# Patient Record
Sex: FEMALE | Race: WHITE
[De-identification: names, ages, dates, MRNs, and addresses within clinical notes are randomized per-mention and may not be internally consistent; named-entity substitution may affect disease eponyms.]

---

## 2019-09-27 ENCOUNTER — HOSPITAL ENCOUNTER (INPATIENT)
Dept: HOSPITAL 56 - MW.OB | Age: 24
LOS: 2 days | Discharge: HOME | DRG: 560 | End: 2019-09-29
Attending: OBSTETRICS & GYNECOLOGY | Admitting: OBSTETRICS & GYNECOLOGY
Payer: COMMERCIAL

## 2019-09-27 DIAGNOSIS — O48.0: Primary | ICD-10-CM

## 2019-09-27 DIAGNOSIS — Z3A.41: ICD-10-CM

## 2019-09-27 PROCEDURE — 3E0P7VZ INTRODUCTION OF HORMONE INTO FEMALE REPRODUCTIVE, VIA NATURAL OR ARTIFICIAL OPENING: ICD-10-PCS | Performed by: OBSTETRICS & GYNECOLOGY

## 2019-09-27 NOTE — PCM.OPNOTE
- General Post-Op/Procedure Note


Date of Surgery/Procedure: 19


Operative Procedure(s): Spontaneous vaginal delivery


Findings: 





Live female infant, Apgars 8/9, weight pending, cord gases pending


Pre Op Diagnosis: 23yo  at 41w0d.  Induction of labor for post-dates


Post-Op Diagnosis: 23yo  s/p spontaneous vaginal delivery at 41w0d


Anesthesia Technique: Epidural


Primary Surgeon: Ivon Ruiz


Pathology: 





Placenta, cord gases, cord blood


EBL in mLs: 300


Complications: none


Condition: Good

## 2019-09-27 NOTE — PCM.PREANE
Preanesthetic Assessment





- Anesthesia/Transfusion/Family Hx


Anesthesia History: Prior Anesthesia Without Reaction


Transfusion History: No Prior Transfusion(s)





- Review of Systems


General: No Symptoms


Pulmonary: No Symptoms


Cardiovascular: No Symptoms


Gastrointestinal: No Symptoms


Neurological: No Symptoms


Other: Reports: None





- Physical Assessment


NPO Status Date: 09/27/19


NPO Status Time: 19:40


Height: 1.65 m


Weight: 77.111 kg


ASA Class: 2E


Mental Status: Alert & Oriented x3


Airway Class: Mallampati = 2


Dentition: Reports: Normal Dentition


Thyro-Mental Finger Breadths: 3


Mouth Opening Finger Breadths: 3


ROM/Head Extension: Full


Lungs: Clear to Auscultation, Normal Respiratory Effort


Cardiovascular: Regular Rate, Regular Rhythm





- Lab


Values: 





 Laboratory Last Values











WBC  9.00 K/uL (4.0-11.0)   09/27/19  11:26    


 


RBC  3.92 M/uL (4.30-5.90)  L  09/27/19  11:26    


 


Hgb  12.9 g/dL (12.0-16.0)   09/27/19  11:26    


 


Hct  36.8 % (36.0-46.0)   09/27/19  11:26    


 


MCV  93.9 fL (80.0-98.0)   09/27/19  11:26    


 


MCH  32.9 pg (27.0-32.0)  H  09/27/19  11:26    


 


MCHC  35.1 g/dL (31.0-37.0)   09/27/19  11:26    


 


RDW Std Deviation  45.7 fl (28.0-62.0)   09/27/19  11:26    


 


RDW Coeff of Pennie  13 % (11.0-15.0)   09/27/19  11:26    


 


Plt Count  243 K/uL (150-400)   09/27/19  11:26    


 


MPV  10.60 fL (7.40-12.00)   09/27/19  11:26    


 


Nucleated RBC %  0.0 /100WBC  09/27/19  11:26    


 


Nucleated RBCs #  0 K/uL  09/27/19  11:26    


 


Blood Type  A POSITIVE   09/27/19  11:26    


 


Antibody Screen  NEGATIVE   09/27/19  11:26    














- Allergies


Allergies/Adverse Reactions: 


 Allergies











Allergy/AdvReac Type Severity Reaction Status Date / Time


 


No Known Allergies Allergy   Verified 11/01/16 20:23














- Acknowledgements


Anesthesia Type Planned: Epidural


Pt an Appropriate Candidate for the Planned Anesthesia: Yes


Alternatives and Risks of Anesthesia Discussed w Pt/Guardian: Yes


Pt/Guardian Understands and Agrees with Anesthesia Plan: Yes





PreAnesthesia Questionnaire


HEENT History: Reports: None


Cardiovascular History: Reports: None


Respiratory History: Reports: None


Gastrointestinal History: Reports: None


Genitourinary History: Reports: None


OB/GYN History: Reports: Pregnancy


Musculoskeletal History: Reports: None


Neurological History: Reports: None


Psychiatric History: Reports: None


Endocrine/Metabolic History: Reports: None


Hematologic History: Reports: None


Immunologic History: Reports: None


Oncologic (Cancer) History: Reports: None


Dermatologic History: Reports: None





- Infectious Disease History


Infectious Disease History: Reports: None





- Past Surgical History


HEENT Surgical History: Reports: Oral Surgery


Other HEENT Surgeries/Procedures: wisdome teeth extraction-with sedation


Cardiovascular Surgical History: Reports: None


Respiratory Surgical History: Reports: None


GI Surgical History: Reports: None


Female  Surgical History: Reports: None


Musculoskeletal Surgical History: Reports: None





- SUBSTANCE USE


Smoking Status *Q: Never Smoker


Second Hand Smoke Exposure: No


Recreational Drug Use History: No





- HOME MEDS


Home Medications: 


 Home Meds





. [No Known Home Meds]  11/01/16 [History]











- CURRENT (IN HOUSE) MEDS


Current Meds: 





 Current Medications





Butorphanol Tartrate (Stadol)  1 mg IVPUSH Q1H PRN


   PRN Reason: Pain


Carboprost Tromethamine (Hemabate Ds)  250 mcg IM ASDIRECTED PRN


   PRN Reason: Post Partum Hemorrhage


Lactated Ringer's (Ringers, Lactated)  1,000 mls @ 150 mls/hr IV ASDIRECTED BERTRAM


   Last Admin: 09/27/19 17:10 Dose:  150 mls/hr


Oxytocin/Sodium Chloride (Oxytocin 30 Unit/500 Ml-Ns)  30 unit in 500 mls @ 500 

mls/hr IV TITRATE BERTRAM


Tranexamic Acid 1,000 mg/ (Sodium Chloride)  110 mls @ 660 mls/hr IV ONETIME PRN


   PRN Reason: Bleeding


Oxytocin/Sodium Chloride (Oxytocin 30 Unit/500 Ml-Ns)  30 unit in 500 mls @ 2 

mls/hr IV TITRATE BERTRAM; Protocol


   Last Titration: 09/27/19 18:40 Dose:  6 munits/min, 6 mls/hr


Lidocaine HCl (Xylocaine 1%)  50 ml INJECT ONETIME PRN


   PRN Reason: Laceration repair


Methylergonovine Maleate (Methergine)  0.2 mg IM ASDIRECTED PRN


   PRN Reason: Post Partum Hemorrhage


Misoprostol (Cytotec)  200 mcg PO ONETIME PRN


   PRN Reason: Post Partum Hemorrhage


Misoprostol (Cytotec)  25 mcg VAG ONETIME PRN


   PRN Reason: Cervical Ripening


   Last Admin: 09/27/19 12:44 Dose:  25 mcg


Misoprostol (Cytotec)  25 mcg VAG Q4H PRN


   PRN Reason: Cervical Ripening


Nalbuphine HCl (Nubain)  10 mg IVPUSH Q1H PRN


   PRN Reason: Pain (severe 7-10)


Sodium Chloride (Saline Flush)  10 ml FLUSH ASDIRECTED PRN


   PRN Reason: Keep Vein Open


Sodium Chloride (Saline Flush)  2.5 ml FLUSH ASDIRECTED PRN


   PRN Reason: Keep Vein Open


Sodium Chloride (Normal Saline)  10 ml IV ASDIRECTED PRN


   PRN Reason: IV Use


Sterile Water (Sterile Water For Irrigation)  1,000 ml IRR ASDIRECTED PRN


   PRN Reason: delivery


Terbutaline Sulfate (Brethine)  0.25 mg SUBCUT ASDIRECTED PRN


   PRN Reason: Tacysystole

## 2019-09-28 NOTE — OR
SURGEON:

Ivon Ruiz MD

 

DATE OF PROCEDURE:  2019

 

PREOPERATIVE DIAGNOSIS:

A 24-year-old, G2,  at 41 weeks and 0 days gestation being induced for

post dates.

 

POSTOPERATIVE DIAGNOSIS:

A 24-year-old, G2, P1-0-1-1 status post spontaneous vaginal delivery at 41 weeks

and 0 days gestation.

 

PROCEDURE:

Spontaneous vaginal delivery.

 

PRIMARY SURGEON:

Ivon Ruiz MD.

 

ANESTHESIA:

Epidural.

 

ESTIMATED BLOOD LOSS:

300 mL.

 

FINDINGS:

Live female infant in cephalic presentation.  Apgar score of 8 and 9 at 1 and 5

minutes respectively.  Umbilical artery pH 7.17, umbilical vein pH 7.25.  
Weight 3630g.

 

DESCRIPTION OF PROCEDURE:

The patient presented from clinic for induction of labor due to postdates.

Induction of labor began with Cytotec.  After 1 dose of Cytotec, she had

spontaneous rupture of membranes.  At that time, Pitocin was begun.  She

received an epidural.  She progressed to complete cervical dilation.  She pushed

and delivered a live female infant, Apgar score of 8 and 9 at 1 and 5 minutes

respectively.  The head delivered followed by the shoulders and remainder of

body.  The infant was placed on the maternal abdomen.  After 60 seconds of

delayed cord clamping, the cord was clamped and cut.  The placenta then

delivered spontaneously intact and with 3-vessel cord.  Cord gases and cord

blood were obtained.  The perineum was inspected.  Two perineal abrasions were

noted and were hemostatic with pressure.  Estimated blood loss was 300 mL.  The

patient and baby tolerated the delivery well.

 

 

[id number] / MODL

DD:  2019 23:15:02

DT:  2019 01:30:57

Job #:  [id number]

MTDD

## 2019-09-28 NOTE — [id number]
Post Anesthesia Note





- EVALUATION WITHIN 48HRS OF ANESTHETIC


Vital Signs in Normal Range: Yes


Patient Participated in Evaluation: Yes


Respiratory Function Stable: Yes


Airway Patent: Yes


Cardiovascular Function Stable: Yes


Hydration Status Stable: Yes


Pain Control Satisfactory: Yes


Nausea and Vomiting Control Satisfactory: Yes


Mental Status Recovered: Yes


Vital Signs: 


 Last Vital Signs











Temp  36.3 C   09/28/19 03:17


 


Pulse  79   09/28/19 03:17


 


Resp  16   09/28/19 03:17


 


BP  128/71   09/28/19 03:17


 


Pulse Ox  98   09/28/19 03:17














- COMMENTS/OBSERVATIONS


Free Text/Narrative:: 


The patient tolerated the procedure well.  There were no apparent anesthetic 

complications at this time.

## 2019-09-28 NOTE — PCM.PNPP
- General Info


Date of Service: 19


Admission Dx/Problem (Free Text): 





Patient doing well. Moderate lochia. Denies pain. Breastfeeding going well.


Functional Status: Reports: Pain Controlled, Tolerating Diet, Ambulating, 

Urinating





- Review of Systems


General: Denies: Fever


HEENT: Reports: No Symptoms


Pulmonary: Denies: Shortness of Breath


Cardiovascular: Denies: Chest Pain


Gastrointestinal: Denies: Abdominal Pain


Genitourinary: Denies: Dysuria


Musculoskeletal: Reports: No Symptoms


Skin: Reports: No Symptoms


Neurological: Reports: No Symptoms


Psychiatric: Reports: No Symptoms





- Patient Data


Vital Signs - Most Recent: 


 Last Vital Signs











Temp  36.6 C   19 08:40


 


Pulse  64   19 08:40


 


Resp  17   19 08:40


 


BP  126/69   19 08:40


 


Pulse Ox  98   19 08:40











Weight - Most Recent: 77.111 kg


Lab Results - Last 24 Hours: 


 Laboratory Results - last 24 hr











  19 Range/Units





  11:26 11:26 22:47 


 


WBC  9.00    (4.0-11.0)  K/uL


 


RBC  3.92 L    (4.30-5.90)  M/uL


 


Hgb  12.9    (12.0-16.0)  g/dL


 


Hct  36.8    (36.0-46.0)  %


 


MCV  93.9    (80.0-98.0)  fL


 


MCH  32.9 H    (27.0-32.0)  pg


 


MCHC  35.1    (31.0-37.0)  g/dL


 


RDW Std Deviation  45.7    (28.0-62.0)  fl


 


RDW Coeff of Pennie  13    (11.0-15.0)  %


 


Plt Count  243    (150-400)  K/uL


 


MPV  10.60    (7.40-12.00)  fL


 


Nucleated RBC %  0.0    /100WBC


 


Nucleated RBCs #  0    K/uL


 


Cord ABG pH    7.171 L  (7.18-7.38)  


 


Cord ABG Base Excess    -11 L  (-10--2)  


 


Cord VBG pH    7.251  (7.25-7.45)  


 


Cord VBG Base Excess    -11 L  (-10--2)  


 


Blood Type   A POSITIVE   


 


Antibody Screen   NEGATIVE   














  19 Range/Units





  05:55 


 


WBC   (4.0-11.0)  K/uL


 


RBC   (4.30-5.90)  M/uL


 


Hgb  12.1  (12.0-16.0)  g/dL


 


Hct  35.1 L  (36.0-46.0)  %


 


MCV   (80.0-98.0)  fL


 


MCH   (27.0-32.0)  pg


 


MCHC   (31.0-37.0)  g/dL


 


RDW Std Deviation   (28.0-62.0)  fl


 


RDW Coeff of Pennie   (11.0-15.0)  %


 


Plt Count   (150-400)  K/uL


 


MPV   (7.40-12.00)  fL


 


Nucleated RBC %   /100WBC


 


Nucleated RBCs #   K/uL


 


Cord ABG pH   (7.18-7.38)  


 


Cord ABG Base Excess   (-10--2)  


 


Cord VBG pH   (7.25-7.45)  


 


Cord VBG Base Excess   (-10--2)  


 


Blood Type   


 


Antibody Screen   











Med Orders - Current: 


 Current Medications





Acetaminophen (Tylenol Extra Strength)  500 mg PO Q4H PRN


   PRN Reason: Pain


Acetaminophen (Tylenol Extra Strength)  1,000 mg PO Q4H PRN


   PRN Reason: Pain


Benzocaine/Menthol (Dermoplast Pain Relief 20%-0.5% Spray)  78 gm TOP 

ASDIRECTED PRN


   PRN Reason: Perineal Comfort Measure


   Last Admin: 19 03:19 Dose:  1 can


Bisacodyl (Dulcolax)  10 mg RECTAL ONETIME PRN


   PRN Reason: Constipation


Butorphanol Tartrate (Stadol)  1 mg IVPUSH Q1H PRN


   PRN Reason: Pain


Carboprost Tromethamine (Hemabate Ds)  250 mcg IM ASDIRECTED PRN


   PRN Reason: Post Partum Hemorrhage


Docusate Sodium (Colace)  100 mg PO BID PRN


   PRN Reason: Constipation


Emollient Ointment (Lansinoh Hpa)  0 gm TOP ASDIRECTED PRN


   PRN Reason: Sore Nipples


Famotidine (Pepcid)  20 mg PO BID PRN


   PRN Reason: Heartburn


Lactated Ringer's (Ringers, Lactated)  1,000 mls @ 150 mls/hr IV ASDIRECTED BERTRAM


   Last Admin: 19 21:25 Dose:  150 mls/hr


Oxytocin/Sodium Chloride (Oxytocin 30 Unit/500 Ml-Ns)  30 unit in 500 mls @ 500 

mls/hr IV TITRATE BERTRAM


Tranexamic Acid 1,000 mg/ (Sodium Chloride)  110 mls @ 660 mls/hr IV ONETIME PRN


   PRN Reason: Bleeding


Oxytocin/Sodium Chloride (Oxytocin 30 Unit/500 Ml-Ns)  30 unit in 500 mls @ 2 

mls/hr IV TITRATE BERTRAM; Protocol


   Last Titration: 19 22:07 Dose:  6 munits/min, 6 mls/hr


Ibuprofen (Motrin)  800 mg PO Q8H PRN


   PRN Reason: Pain


   Last Admin: 19 03:19 Dose:  800 mg


Lidocaine HCl (Xylocaine 1%)  50 ml INJECT ONETIME PRN


   PRN Reason: Laceration repair


Methylergonovine Maleate (Methergine)  0.2 mg IM ASDIRECTED PRN


   PRN Reason: Post Partum Hemorrhage


Misoprostol (Cytotec)  200 mcg PO ONETIME PRN


   PRN Reason: Post Partum Hemorrhage


Misoprostol (Cytotec)  25 mcg VAG ONETIME PRN


   PRN Reason: Cervical Ripening


   Last Admin: 19 12:44 Dose:  25 mcg


Misoprostol (Cytotec)  25 mcg VAG Q4H PRN


   PRN Reason: Cervical Ripening


Nalbuphine HCl (Nubain)  10 mg IVPUSH Q1H PRN


   PRN Reason: Pain (severe 7-10)


Oxycodone HCl (Oxycodone)  5 mg PO Q2H PRN


   PRN Reason: Pain


Simethicone (Simethicone)  80 mg PO Q4H PRN


   PRN Reason: Gas


Sodium Chloride (Saline Flush)  10 ml FLUSH ASDIRECTED PRN


   PRN Reason: Keep Vein Open


Sodium Chloride (Saline Flush)  2.5 ml FLUSH ASDIRECTED PRN


   PRN Reason: Keep Vein Open


Sodium Chloride (Normal Saline)  10 ml IV ASDIRECTED PRN


   PRN Reason: IV Use


Sterile Water (Sterile Water For Irrigation)  1,000 ml IRR ASDIRECTED PRN


   PRN Reason: delivery


   Last Admin: 19 21:55 Dose:  1,000 ml


Terbutaline Sulfate (Brethine)  0.25 mg SUBCUT ASDIRECTED PRN


   PRN Reason: Tacysystole


Witch Hazel (Tucks)  1 pad TOP ASDIRECTED PRN


   PRN Reason: comfort care


   Last Admin: 19 03:19 Dose:  1 tube





Discontinued Medications





Ropivacaine (Naropin 0.2%) Confirm Administered Dose 100 mls @ as directed 

.ROUTE .STK-MED ONE


   Stop: 19 20:11











- Infant Interaction


Infant Disposition, Postpartum:  in Room with Family


Infant Feeding: Attempted Breastfeeding; Nursed Fair/Poor


Support Person: 





- Postpartum Recovery Exam


Fundal Tone: Firm


Fundal Level: 1 Fingerbreadths Below Umbilicus


Fundal Placement: Midline


Lochia Amount: Scant


Lochia Color: Rubra/Red


Perineum Description: Intact, Minimal Bruising/Swelling


Episiotomy/Laceration: None


Bladder Status: Voiding





- Exam


General: Alert, Oriented


Neck: Supple


Lungs: Clear to Auscultation, Normal Respiratory Effort


Cardiovascular: Regular Rate, Regular Rhythm


GI/Abdominal Exam: Soft, Non-Tender


Extremities: Normal Inspection


Skin: Warm, Dry, Intact


Neurological: No New Focal Deficit


Psy/Mental Status: Alert, Normal Affect, Normal Mood





- Problem List & Annotations


(1) Vaginal delivery


SNOMED Code(s): 189604789


   Code(s): O80 - ENCOUNTER FOR FULL-TERM UNCOMPLICATED DELIVERY   Status: 

Acute   Current Visit: Yes   





- Problem List Review


Problem List Initiated/Reviewed/Updated: No





- My Orders


Last 24 Hours: 


My Active Orders





19 23:03


Patient Status [ADT] Routine 


Notify Provider Vital Signs [RC] ASDIRECTED 


Acetaminophen [Tylenol Extra Strength]   1,000 mg PO Q4H PRN 


Acetaminophen [Tylenol Extra Strength]   500 mg PO Q4H PRN 


Benzocaine/Menthol [Dermoplast Pain Relief 20%-0.5% Spray]   78 gm TOP 

ASDIRECTED PRN 


Bisacodyl [Dulcolax]   10 mg RECTAL ONETIME PRN 


Docusate Sodium [Colace]   100 mg PO BID PRN 


Famotidine [Pepcid]   20 mg PO BID PRN 


Ibuprofen [Motrin]   800 mg PO Q8H PRN 


Lanolin [Lansinoh HPA]   See Dose Instructions  TOP ASDIRECTED PRN 


Simethicone   80 mg PO Q4H PRN 


Witch Hazel [Tucks]   1 pad TOP ASDIRECTED PRN 


oxyCODONE   5 mg PO Q2H PRN 


Assess Lochia [WOMSER] Per Unit Routine 


Assess Uterine Involution [WOMSER] Per Unit Routine 


Breast Pump [WOMSER] Per Unit Routine 


Ice Therapy [OM.PC] Per Unit Routine 


Perineal Care [OM.PC] Per Unit Routine 


Peripheral IV Discontinue [OM.PC] Routine 


Sitz Bath [OM.PC] Per Unit Routine 





19 22:47


Ready for Discharge [RC] PER UNIT ROUTINE 





19 Breakfast


Regular Diet [DIET] 














- Assessment


Assessment:: 





PPD#1 s/p spontaneous vaginal delivery





- Plan


Plan:: 





Patient desires discharge home at 24 hours postpartum if baby cleared. Reviewed 

discharge instructions.

## 2019-09-29 VITALS — SYSTOLIC BLOOD PRESSURE: 127 MMHG | HEART RATE: 60 BPM | DIASTOLIC BLOOD PRESSURE: 69 MMHG

## 2019-09-29 NOTE — PCM.PNPP
- General Info


Date of Service: 19


Admission Dx/Problem (Free Text): 





Patient doing well. Cramping worst when breastfeeding. Has been able to get 

some sleep. Voiding and ambulating. Minimal lochia.


Functional Status: Reports: Pain Controlled, Tolerating Diet, Ambulating, 

Urinating





- Review of Systems


General: Reports: No Symptoms.  Denies: Fever


HEENT: Reports: No Symptoms


Pulmonary: Denies: Shortness of Breath


Cardiovascular: Denies: Chest Pain


Gastrointestinal: Reports: Abdominal Pain


Genitourinary: Reports: No Symptoms


Musculoskeletal: Reports: No Symptoms


Skin: Reports: No Symptoms


Neurological: Reports: No Symptoms


Psychiatric: Reports: No Symptoms





- Patient Data


Vital Signs - Most Recent: 


 Last Vital Signs











Temp  36.8 C   19 08:00


 


Pulse  60   19 08:00


 


Resp  16   19 08:00


 


BP  127/69   19 08:00


 


Pulse Ox  95   19 08:00











Weight - Most Recent: 77.111 kg


Med Orders - Current: 


 Current Medications





Acetaminophen (Tylenol Extra Strength)  500 mg PO Q4H PRN


   PRN Reason: Pain


Acetaminophen (Tylenol Extra Strength)  1,000 mg PO Q4H PRN


   PRN Reason: Pain


Benzocaine/Menthol (Dermoplast Pain Relief 20%-0.5% Spray)  78 gm TOP 

ASDIRECTED PRN


   PRN Reason: Perineal Comfort Measure


   Last Admin: 19 03:19 Dose:  1 can


Bisacodyl (Dulcolax)  10 mg RECTAL ONETIME PRN


   PRN Reason: Constipation


Butorphanol Tartrate (Stadol)  1 mg IVPUSH Q1H PRN


   PRN Reason: Pain


Carboprost Tromethamine (Hemabate Ds)  250 mcg IM ASDIRECTED PRN


   PRN Reason: Post Partum Hemorrhage


Docusate Sodium (Colace)  100 mg PO BID PRN


   PRN Reason: Constipation


Emollient Ointment (Lansinoh Hpa)  0 gm TOP ASDIRECTED PRN


   PRN Reason: Sore Nipples


   Last Admin: 19 18:30 Dose:  7 gm


Famotidine (Pepcid)  20 mg PO BID PRN


   PRN Reason: Heartburn


Lactated Ringer's (Ringers, Lactated)  1,000 mls @ 150 mls/hr IV ASDIRECTED BERTRAM


   Last Admin: 19 21:25 Dose:  150 mls/hr


Oxytocin/Sodium Chloride (Oxytocin 30 Unit/500 Ml-Ns)  30 unit in 500 mls @ 500 

mls/hr IV TITRATE BERTRAM


Tranexamic Acid 1,000 mg/ (Sodium Chloride)  110 mls @ 660 mls/hr IV ONETIME PRN


   PRN Reason: Bleeding


Oxytocin/Sodium Chloride (Oxytocin 30 Unit/500 Ml-Ns)  30 unit in 500 mls @ 2 

mls/hr IV TITRATE BERTRAM; Protocol


   Last Titration: 19 22:07 Dose:  6 munits/min, 6 mls/hr


Ibuprofen (Motrin)  800 mg PO Q8H PRN


   PRN Reason: Pain


   Last Admin: 19 03:19 Dose:  800 mg


Lidocaine HCl (Xylocaine 1%)  50 ml INJECT ONETIME PRN


   PRN Reason: Laceration repair


Methylergonovine Maleate (Methergine)  0.2 mg IM ASDIRECTED PRN


   PRN Reason: Post Partum Hemorrhage


Misoprostol (Cytotec)  200 mcg PO ONETIME PRN


   PRN Reason: Post Partum Hemorrhage


Misoprostol (Cytotec)  25 mcg VAG ONETIME PRN


   PRN Reason: Cervical Ripening


   Last Admin: 19 12:44 Dose:  25 mcg


Misoprostol (Cytotec)  25 mcg VAG Q4H PRN


   PRN Reason: Cervical Ripening


Nalbuphine HCl (Nubain)  10 mg IVPUSH Q1H PRN


   PRN Reason: Pain (severe 7-10)


Oxycodone HCl (Oxycodone)  5 mg PO Q2H PRN


   PRN Reason: Pain


Simethicone (Simethicone)  80 mg PO Q4H PRN


   PRN Reason: Gas


Sodium Chloride (Saline Flush)  10 ml FLUSH ASDIRECTED PRN


   PRN Reason: Keep Vein Open


Sodium Chloride (Saline Flush)  2.5 ml FLUSH ASDIRECTED PRN


   PRN Reason: Keep Vein Open


Sodium Chloride (Normal Saline)  10 ml IV ASDIRECTED PRN


   PRN Reason: IV Use


Sterile Water (Sterile Water For Irrigation)  1,000 ml IRR ASDIRECTED PRN


   PRN Reason: delivery


   Last Admin: 19 21:55 Dose:  1,000 ml


Terbutaline Sulfate (Brethine)  0.25 mg SUBCUT ASDIRECTED PRN


   PRN Reason: Tacysystole


Witch Hazel (Tucks)  1 pad TOP ASDIRECTED PRN


   PRN Reason: comfort care


   Last Admin: 19 03:19 Dose:  1 tube





Discontinued Medications





Ropivacaine (Naropin 0.2%) Confirm Administered Dose 100 mls @ as directed 

.ROUTE .STK-MED ONE


   Stop: 19 20:11


   Last Admin: 19 19:25 Dose:  Not Given











- Infant Interaction


Infant Disposition, Postpartum: [place] in Room with Family


Infant Interaction: Holding Infant


Infant Feeding: Attempted Breastfeeding; Nursed Fair/Poor


Support Person: Significant Other





- Postpartum Recovery Exam


Fundal Tone: Firm


Fundal Level: 1 Fingerbreadths Below Umbilicus


Fundal Placement: Midline


Lochia Amount: Scant


Lochia Color: Rubra/Red


Perineum Description: Intact, Minimal Bruising/Swelling, Other (see below)


Other Perinuem Description: skid marks


Episiotomy/Laceration: Approximated


Bladder Status: Voiding


Urinary Elimination: Voided





- Exam


General: Alert, Oriented, Cooperative


Neck: Supple


Lungs: Clear to Auscultation


Cardiovascular: Regular Rate, Regular Rhythm


GI/Abdominal Exam: Soft, Non-Tender


Extremities: Normal Inspection


Skin: Warm, Dry, Intact


Neurological: No New Focal Deficit


Psy/Mental Status: Alert, Normal Affect, Normal Mood





- Problem List & Annotations


(1) Vaginal delivery


SNOMED Code(s): 190643104


   Code(s): O80 - ENCOUNTER FOR FULL-TERM UNCOMPLICATED DELIVERY   Status: 

Acute   Current Visit: Yes   





- Problem List Review


Problem List Initiated/Reviewed/Updated: Yes





- My Orders


Last 24 Hours: 


My Active Orders





19 22:47


Ready for Discharge [RC] PER UNIT ROUTINE 





19 10:41


Ready for Discharge [RC] PER UNIT ROUTINE 














- Assessment


Assessment:: 





PPD#2 s/p spontaneous vaginal delivery





- Plan


Plan:: 





Baby cleared by Peds for discharge home today. Reviewed discharge instructions.